# Patient Record
Sex: MALE | Race: WHITE | NOT HISPANIC OR LATINO | Employment: FULL TIME | URBAN - METROPOLITAN AREA
[De-identification: names, ages, dates, MRNs, and addresses within clinical notes are randomized per-mention and may not be internally consistent; named-entity substitution may affect disease eponyms.]

---

## 2020-09-01 ENCOUNTER — OFFICE VISIT (OUTPATIENT)
Dept: URGENT CARE | Facility: CLINIC | Age: 20
End: 2020-09-01
Payer: COMMERCIAL

## 2020-09-01 VITALS
WEIGHT: 165 LBS | BODY MASS INDEX: 25.01 KG/M2 | TEMPERATURE: 97 F | OXYGEN SATURATION: 99 % | RESPIRATION RATE: 20 BRPM | HEART RATE: 107 BPM | HEIGHT: 68 IN

## 2020-09-01 DIAGNOSIS — B34.9 VIRAL ILLNESS: ICD-10-CM

## 2020-09-01 DIAGNOSIS — Z20.822 EXPOSURE TO COVID-19 VIRUS: ICD-10-CM

## 2020-09-01 DIAGNOSIS — R05.9 COUGH: Primary | ICD-10-CM

## 2020-09-01 LAB
CTP QC/QA: YES
SARS-COV-2 RDRP RESP QL NAA+PROBE: NEGATIVE

## 2020-09-01 PROCEDURE — 99201 PR OFFICE/OUTPT VISIT,NEW,LEVL I: ICD-10-PCS | Mod: S$GLB,,, | Performed by: NURSE PRACTITIONER

## 2020-09-01 PROCEDURE — U0002: ICD-10-PCS | Mod: S$GLB,,, | Performed by: NURSE PRACTITIONER

## 2020-09-01 PROCEDURE — U0002 COVID-19 LAB TEST NON-CDC: HCPCS | Mod: S$GLB,,, | Performed by: NURSE PRACTITIONER

## 2020-09-01 PROCEDURE — 99201 PR OFFICE/OUTPT VISIT,NEW,LEVL I: CPT | Mod: S$GLB,,, | Performed by: NURSE PRACTITIONER

## 2020-09-01 NOTE — PATIENT INSTRUCTIONS
Your test was NEGATIVE for COVID-19 (coronavirus).      You may leave home and/or return to work when the following conditions are met:   24 hours fever free without fever-reducing medications AND   Improved symptoms      If your symptoms worsen or if you have any other concerns, please contact [facility] at [phone].     Sincerely,    Mary Levin NP                                              Viral Illness  If your condition worsens or fails to improve we recommend that you receive another evaluation at the ER immediately or contact your PCP to discuss your concerns or return here. You must understand that you've received an urgent care treatment only and that you may be released before all your medical problems are known or treated. You the patient will arrange for follw care as instructed.   We discussed that your illness is viral in nature so you will treat this symptomatically.    Claritin or Zyrtec for allergy symptoms  Tylenol or Motrin for fever, pain or sore throat  Rest and fluids are important

## 2020-09-01 NOTE — PROGRESS NOTES
"Subjective:       Patient ID: Gordo Greene is a 20 y.o. male.    Vitals:  height is 5' 8" (1.727 m) and weight is 74.8 kg (165 lb). His temporal temperature is 97.1 °F (36.2 °C). His pulse is 107. His respiration is 20 and oxygen saturation is 99%.     Chief Complaint: COVID-19 Concerns    URI   This is a new problem. The current episode started today. The problem has been unchanged. There has been no fever. Associated symptoms include coughing and rhinorrhea. Pertinent negatives include no abdominal pain, chest pain, congestion, diarrhea, dysuria, ear pain, headaches, joint pain, joint swelling, nausea, neck pain, plugged ear sensation, rash, sinus pain, sneezing, sore throat, swollen glands, vomiting or wheezing. He has tried nothing for the symptoms.       Constitution: Positive for fatigue. Negative for chills, sweating and fever.   HENT: Negative for ear pain, congestion, sinus pain, sinus pressure, sore throat and voice change.         Generalized body aches   Neck: Negative for neck pain and painful lymph nodes.   Cardiovascular: Negative for chest pain.   Eyes: Negative for eye redness.   Respiratory: Positive for cough. Negative for chest tightness, sputum production, bloody sputum, COPD, shortness of breath, stridor, wheezing and asthma.    Gastrointestinal: Negative for abdominal pain, nausea, vomiting and diarrhea.   Endocrine: negative.   Genitourinary: Negative for dysuria.   Musculoskeletal: Negative for muscle ache.   Skin: Negative for rash.   Allergic/Immunologic: Negative for seasonal allergies, asthma and sneezing.   Neurological: Negative for headaches.   Hematologic/Lymphatic: Negative for swollen lymph nodes.   Psychiatric/Behavioral: Negative.        Objective:      Physical Exam      Audio Only Telehealth Visit     The patient location is: [facility]   The chief complaint leading to consultation is: Cough   Visit type: Virtual visit with audio only (telephone)  Total time " spent with patient: 20 mins     The reason for the audio only service rather than synchronous audio and video virtual visit was related to technical difficulties or patient preference/necessity.     Each patient to whom I provide medical services by telemedicine is:  (1) informed of the relationship between the physician and patient and the respective role of any other health care provider with respect to management of the patient; and (2) notified that they may decline to receive medical services by telemedicine and may withdraw from such care at any time. Patient verbally consented to receive this service via voice-only telephone call.       HPI:  20-year-old male with  COVID-19 concerns; presents to the clinic with symptoms of fatigue, generalized body aches and cough.  Reports exposure to a person who tested positive over the weekend.  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  Denies fever, chills, sweats, nausea, vomiting, diarrhea, post nasal drip, runny nose, nasal or sinus congestion, shortness of breath, chest/back pain with breathing, feeling winded with activity, dizziness, headache, loss of taste or smell and sore throat.        Assessment and plan:  See Below       This service was not originating from a related E/M service provided within the previous 7 days nor will  to an E/M service or procedure within the next 24 hours or my soonest available appointment.  Prevailing standard of care was able to be met in this audio-only visit.      Results for orders placed or performed in visit on 09/01/20   POCT COVID-19 Rapid Screening   Result Value Ref Range    POC Rapid COVID Negative Negative     Acceptable Yes       Assessment:       1. Cough    2. Exposure to Covid-19 Virus    3. Viral illness        Plan:         Cough  -     POCT COVID-19 Rapid Screening    Exposure to Covid-19 Virus  -     POCT COVID-19 Rapid Screening    Viral illness          Patient Instructions   Your test  was NEGATIVE for COVID-19 (coronavirus).      You may leave home and/or return to work when the following conditions are met:   24 hours fever free without fever-reducing medications AND   Improved symptoms      If your symptoms worsen or if you have any other concerns, please contact [facility] at [phone].     Sincerely,    Mary Levin NP                                              Viral Illness  If your condition worsens or fails to improve we recommend that you receive another evaluation at the ER immediately or contact your PCP to discuss your concerns or return here. You must understand that you've received an urgent care treatment only and that you may be released before all your medical problems are known or treated. You the patient will arrange for [facility] care as instructed.   We discussed that your illness is viral in nature so you will treat this symptomatically.    Claritin or Zyrtec for allergy symptoms  Tylenol or Motrin for fever, pain or sore throat  Rest and fluids are important

## 2020-09-03 ENCOUNTER — TELEPHONE (OUTPATIENT)
Dept: URGENT CARE | Facility: CLINIC | Age: 20
End: 2020-09-03